# Patient Record
(demographics unavailable — no encounter records)

---

## 2025-07-01 NOTE — ASSESSMENT
[FreeTextEntry1] : 11 year male with chronic sore throat and globus sensation with unknown etiology and uncertain prognosis.  Post nasal drip vs reflux as likely etiology. Nasal congestion and adenoid hypertrophy. Discussed medical vs surgical therapy   May benefit from nasal saline/irrigations. If doing irrigations, recommend using distilled water and doing in shower twice a day at minimum. Use 0.9% and not hypertonic and slightly warm up to improve discomfort with irrigation. No other irrigation medications at this time. This will attempt to remove mucus and irritants/allergens.    May benefit from avoidance of home allergens and irritants. Avoiding smoke and pets, especially in the bedroom. Remove any carpeting in the bedroom and no stuffed animals.  Wash sheets in hot water once per week.  Hypoallergenic covers for bedding and pillows.  Consider HEPA filter.  Consider allergy referral.   Flonase trial for at least 3 weeks.  Rx sent. Discussed potential risks and benefits and alternatives to the use of this medication and discussed the dosing and administration of this medication.  I discussed non-surgical strategies for chronic throat pain including saltwater gargles.  I instructed the family how to deliver this intervention.   Symptoms that may be associated with LPR/GRD which may play a part.  Recommend stay away from acidic and spicy foods, chocolate, caffeine, eating late at night, lying down within 1 hour of eating. Can elevate bed at night with a wedge pillow. Consider H2B or PPI in the future. Consider GI referral.  RTC 3-4 months

## 2025-07-01 NOTE — DATA REVIEWED
[FreeTextEntry1] : Parent used as independent historian given patient age and maturity.    The relevant medical records as well as any relevant testing, imaging, and other order results were independently reviewed and interpreted by me and discussed with family.  Urgent care visit notes reviewed Covid culture from 5-26-25 reviewed and independently interpreted by me and discussed with family. Negative

## 2025-07-01 NOTE — HISTORY OF PRESENT ILLNESS
[de-identified] : 7-1-25 11yr M here for initial evaluation of chronic sore throat  Sore throat x1mo  Negative strep, mono, covid No strep infection in prior 6 months  Occasional swallowing difficulty due to feeling like there is something stuck in the back of the throat, worse with spicy food  Has never tried reflux meds  Frequent throat clearing  No itchy eyes or runny nose   No snoring or pausing in breathing  No chronic nasal congestion  No recent AOM  No hearing or speech concerns   No family history of bleeding disorder or complications from anesthesia  Not followed by any other specialists  No asthma or RAD